# Patient Record
Sex: FEMALE | Race: WHITE | URBAN - METROPOLITAN AREA
[De-identification: names, ages, dates, MRNs, and addresses within clinical notes are randomized per-mention and may not be internally consistent; named-entity substitution may affect disease eponyms.]

---

## 2022-10-24 ENCOUNTER — HOSPITAL ENCOUNTER (OUTPATIENT)
Dept: PHYSICAL THERAPY | Age: 52
Setting detail: RECURRING SERIES
Discharge: HOME | End: 2022-10-27
Payer: COMMERCIAL

## 2022-10-24 PROCEDURE — 97110 THERAPEUTIC EXERCISES: CPT

## 2022-10-24 PROCEDURE — 97161 PT EVAL LOW COMPLEX 20 MIN: CPT

## 2022-10-24 ASSESSMENT — PAIN SCALES - GENERAL: PAINLEVEL_OUTOF10: 3

## 2022-10-24 NOTE — PLAN OF CARE
Rakan Shay  : 1970  Primary: [address]  Secondary:  [address] @ [facility]  [address] [place] [address] [address] Clifford Mcdaniels [facility] [address]  Phone: [phone]  Fax: [phone] Plan Frequency: 2x per week for 12 weeks  Plan of Care/Certification Expiration Date: 23    PT Visit Info:         Visit Count:  1                OUTPATIENT PHYSICAL THERAPY:             OP NOTE TYPE: Initial Assessment 10/24/2022               Episode  Appt Desk         Treatment Diagnosis:  Difficulty in walking, Not elsewhere classified (R26.2), left groin pain R10.32, low back pain M54.5  Medical/Referring Diagnosis:  Left lower quadrant pain [R10.32]  Referring Physician:  NAOMI Zuniga NP, MD Orders:  PT Eval and Treat   Return MD Appt:  as needed  Date of Onset:    August  Allergies:  Patient has no allergy information on record.   Restrictions/Precautions:  none         Medications Last Reviewed:  10/24/2022   Meloxicam, cymbalta, hyrodiuril, adipex-p, desyrel, nulytely  SUBJECTIVE   History of Injury/Illness (Reason for Referral):  Pt with increased left groin pain since August. She reports it is gradually worsening and it is limiting her ability to walk, stand and get out of car  Patient Stated Goal(s):  \"to decrease the pain\"  Initial:     3/10 Post Session:     3/10  Past Medical History/Comorbidities:   HTN, anxiety, joint pain and joint swelling  Social History/Living Environment:   Lives With: Alone  Home Layout: Multi-level     Prior Level of Function/Work/Activity:   Prior level of function: Independent  Occupation: Full time employment  Type of Occupation: special            Learning:   Does the patient/guardian have any barriers to learning?: No barriers  Will there be a co-learner?: No  What is the preferred language of the patient/guardian?: English  Is an  required?: No  How does the patient/guardian prefer to learn new concepts?: Listening; Reading; Demonstration; Pictures/Videos     Fall Risk Scale: Dow Total Score: 25  Dow Fall Risk: Medium (25-44)           OBJECTIVE   LE strength measures:     R LE L LE   Hip flexors   5/5     4-/5   Glut medius  4 /5   3-/5   quad   5/5   4/5   hamstrings   5/5   5/5   Ankle DF's   5/5   5/5   Ankle PF's   5/5   5/5     Passive hip ROM is comparable  Active left hip flexion is limited in sitting postion    ASSESSMENT   Initial Assessment:  Pt with increased left groin pain since August.  She can not recall an injury. She did have x-ray on lumbar spine and hips which showed mild DDD and mild hip OA. She has increased difficulty with prolonged standing, walking and doing her daily tasks like taking out the garbage and getting in and out of car. Problem List: (Impacting functional limitations): Body Structures, Functions, Activity Limitations Requiring Skilled Therapeutic Intervention: Decreased functional mobility ; Decreased strength; Increased pain     Therapy Prognosis:   Therapy Prognosis: Fair     Assessment Complexity:   Low Complexity  PLAN   Effective Dates: 10/24/22 TO Plan of Care/Certification Expiration Date: 01/24/23   Frequency/Duration: Plan Frequency: 2x per week for 12 weeks   Interventions Planned (Treatment may consist of any combination of the following):    Current Treatment Recommendations: Strengthening; ROM; Manual Therapy - Soft Tissue Mobilization; Manual Therapy - Joint Manipulation; Home exercise program; Modalities; Aquatics     Goals: (Goals have been discussed and agreed upon with patient.)  Short-Term Functional Goals: Time Frame: 6 weeks  Pt will be independent with HEP. Pt will be able to walk up to 30 minutes without increased pain. Pt will be able to reciprocally climb 4-6 steps    Discharge Goals: Time Frame: 12 weeks  Pt will be able to walk up to 45  minutes without increased pain.   Pt will be able to increase strength by 1/2 grade to one full grade to raise the leg with more ease. Pt will be able to get in and out of car without difficulty. Pt will be able to perform her normal home ans work tasks. Outcome Measure: Tool Used: Lower Extremity Functional Scale (LEFS)  Score:  Initial: 32/80 Most Recent: X/80 (Date: -- )   Interpretation of Score: 20 questions each scored on a 5 point scale with 0 representing \"extreme difficulty or unable to perform\" and 4 representing \"no difficulty\". The lower the score, the greater the functional disability. 80/80 represents no disability. Minimal detectable change is 9 points. Medical Necessity:   > Skilled intervention continues to be required due to increased pain and limited left hip ROM and strength. Reason For Services/Other Comments:  > Patient continues to require skilled intervention due to lack of full strength and decreased left hip motion affecting her ability to complete her ADL's/ hobbies. Total Duration:  Time In: 1015  Time Out: 1100    Regarding Olivia Carmichael's therapy, I certify that the treatment plan above will be carried out by a therapist or under their direction.   Thank you for this referral,  Charley Das, PT     Referring Physician Signature: Samson Young, * _______________________________ Date _____________        Post Session Pain  Charge Capture  PT Visit Info MD Yuridia Cristobal

## 2022-10-24 NOTE — PROGRESS NOTES
Carl Evans  : 1970  Primary: [address]  Secondary:  [address] @ [facility]  [address] [facility] Mattie Spaulding [place] [address]  Phone: [phone]  Fax: [phone] Plan Frequency: 2x per week for 12 weeks    Plan of Care/Certification Expiration Date: 23      PT Visit Info:  No data recorded   Visit Count:  1   OUTPATIENT PHYSICAL THERAPY:OP NOTE TYPE: Treatment Note 10/24/2022       Episode  }Appt Desk             Treatment Diagnosis:  Difficulty in walking, Not elsewhere classified (R26.2), left groin pain R10.32, lower back pain M54.5  Medical/Referring Diagnosis:  Left lower quadrant pain [R10.32]  Referring Physician:  NAOMI Choi NP, MD Orders:  PT Eval and Treat   Date of Onset:  No data recorded   Allergies:   Patient has no allergy information on record. Restrictions/Precautions:  No data recorded  No data recorded   Interventions Planned (Treatment may consist of any combination of the following):    Current Treatment Recommendations: Strengthening; ROM; Manual Therapy - Soft Tissue Mobilization; Manual Therapy - Joint Manipulation; Home exercise program; Modalities; Aquatics     Subjective Comments:  Pt reports she has some pain today  Initial:}    3/10Post Session:       3/10  Medications Last Reviewed:  10/24/2022  Updated Objective Findings:  See evaluation note from today  Treatment   THERAPEUTIC EXERCISE: (10 minutes):    Exercises per grid below to improve mobility and strength. Required minimal verbal cues to promote proper body mechanics. Progressed resistance, range, and repetitions as indicated. Review of HEP to include hip flexor stretching, sidelying hip flexion, standing hip abduction and palloff press  MANUAL THERAPY: (0 minutes):   Joint mobilization was utilized and necessary because of the patient's restricted joint motion.     Date:  na Date:   Date:     Activity/Exercise Parameters Parameters Parameters   na na Treatment/Session Summary:    Treatment Assessment:  Pt demonstrated proper exercises  Communication/Consultation:  None today  Equipment provided today:  green TB  Recommendations/Intent for next treatment session: Next visit will focus on aquatic therapy.     Total Treatment Billable Duration:  45 minutes  Time In: 7191  Time Out: [address], PT       Charge Capture  }Post Session Pain  PT Visit [address] [facility] Portal  MD Guidelines  Scanned Media  Benefits  MyChart    Future Appointments   Date Time Provider Paulie Mackey   10/27/2022  3:15 PM [address], [facility]   10/31/2022  2:30 PM [address], [facility]   11/3/2022  8:00 AM [address], [facility]

## 2022-10-27 ENCOUNTER — HOSPITAL ENCOUNTER (OUTPATIENT)
Dept: PHYSICAL THERAPY | Age: 52
Setting detail: RECURRING SERIES
End: 2022-10-27
Payer: COMMERCIAL

## 2022-10-27 NOTE — PROGRESS NOTES
Danii Dago  : 1970  Primary: [address]  Secondary:  [address] @ [facility]  [address] [place] [address] Demetrius Poon [place] [address]  Phone: [phone]  Fax: [phone] Plan Frequency: 2x per week for 12 weeks    Plan of Care/Certification Expiration Date: 23      PT Visit Info:  No data recorded       OUTPATIENT PHYSICAL THERAPY 10/27/2022     Appt Desk   Episode   MyChart      Ms. Jailene Najera cancelled appointment today due to schedule conflict.     Ernie Pedroza PTA    Future Appointments   Date Time Provider Paulie Mackey   10/27/2022  3:15 PM Ernie Pedroza [facility]   10/31/2022  2:30 PM Ernie Pedorza [facility]   11/3/2022  8:00 AM Ernie Pedroza PTA [facility]

## 2022-10-31 ENCOUNTER — HOSPITAL ENCOUNTER (OUTPATIENT)
Dept: PHYSICAL THERAPY | Age: 52
Setting detail: RECURRING SERIES
End: 2022-10-31
Payer: COMMERCIAL

## 2022-10-31 NOTE — PROGRESS NOTES
Topher Larson  : 1970  Primary: [address]  Secondary:  Eldon Seo @ [address] [facility] [address]  Phone: [phone]  Fax: [phone] Plan Frequency: 2x per week for 12 weeks    Plan of Care/Certification Expiration Date: 23      PT Visit Info:  No data recorded       OUTPATIENT PHYSICAL THERAPY 10/31/2022     Appt Desk   Episode   MyChart      Ms. Timbo Tomas cancelled appointment today due to schedule conflict.     Lewis Nguyen PTA    Future Appointments   Date Time Provider Paulie Mackey   10/31/2022  2:30 PM Lewis Nguyen [facility]   2022  4:00 PM [facility]   2022  4:00 PM Lewis Nguyen PTA [facility]

## 2022-12-06 ENCOUNTER — HOSPITAL ENCOUNTER (OUTPATIENT)
Dept: PHYSICAL THERAPY | Age: 52
Setting detail: RECURRING SERIES
Discharge: HOME | End: 2022-12-09
Payer: COMMERCIAL

## 2022-12-06 PROCEDURE — 97113 AQUATIC THERAPY/EXERCISES: CPT

## 2022-12-06 ASSESSMENT — PAIN SCALES - GENERAL: PAINLEVEL_OUTOF10: 4

## 2022-12-06 NOTE — PROGRESS NOTES
Monalisa Aleman  : 1970  Primary: [address]  Secondary:  [address] @ [facility]  [address] [facility] [address]  Phone: [phone]  Fax: [phone] Plan Frequency: 2x per week for 12 weeks    Plan of Care/Certification Expiration Date: 23      PT Visit Info:  No data recorded   Visit Count:  2   OUTPATIENT PHYSICAL THERAPY:OP NOTE TYPE: Treatment Note 2022       Episode  }Appt Desk             Treatment Diagnosis:  Difficulty in walking, Not elsewhere classified (R26.2), left groin pain R10.32, lower back pain M54.5  Medical/Referring Diagnosis:  Left lower quadrant pain [R10.32]  Referring Physician:  NAOMI Ye NP, MD Orders:  PT Eval and Treat   Date of Onset:  No data recorded   Allergies:   Patient has no allergy information on record. Restrictions/Precautions:  No data recorded  No data recorded   Interventions Planned (Treatment may consist of any combination of the following):    Current Treatment Recommendations: Strengthening; ROM; Manual Therapy - Soft Tissue Mobilization; Manual Therapy - Joint Manipulation; Home exercise program; Modalities; Aquatics     Subjective Comments:  Patient reports still having the pain in her groin area. Initial:}    4/10Post Session:       2/10  Medications Last Reviewed:  2022  Updated Objective Findings:  None Today  Treatment   THERAPEUTIC EXERCISE: (minutes):    Exercises per grid below to improve mobility and strength. Required minimal verbal cues to promote proper body mechanics. Progressed resistance, range, and repetitions as indicated. Review of HEP to include hip flexor stretching, sidelying hip flexion, standing hip abduction and palloff press  MANUAL THERAPY: (0 minutes):   Joint mobilization was utilized and necessary because of the patient's restricted joint motion.     Date:  na Date:   Date:     Activity/Exercise Parameters Parameters Parameters   na na Aquatic Therapy: 45 min   Walking forward/sidestepping  Hip abd   SLR  Heel toe raises  Alt step ups  marching    Treatment/Session Summary:    Treatment Assessment:  Patient toleratetd treatment well today. Patient plans to possibly see an orthopedic doctor about the possiblity that this discomfort is her hip. Communication/Consultation:  None today  Equipment provided today:  green TB  Recommendations/Intent for next treatment session: Next visit will focus on aquatic therapy.     Total Treatment Billable Duration:  45 minutes  Time In: 0400  Time Out: Barrie Parra, PTA       Charge Capture  }Post Session Pain  PT Visit Info  [facility] Portal  MD Guidelines  Scanned Media  Benefits  MyChart    Future Appointments   Date Time Provider Paulie Mackey   12/8/2022  7:00 PM [facility]

## 2022-12-08 ENCOUNTER — HOSPITAL ENCOUNTER (OUTPATIENT)
Dept: PHYSICAL THERAPY | Age: 52
Setting detail: RECURRING SERIES
End: 2022-12-08
Payer: COMMERCIAL

## 2022-12-08 NOTE — PROGRESS NOTES
Kenia Mason  : 1970  Primary: [address]  Secondary:  [address] @ [facility]  [address] [address]  Phone: [phone]  Fax: [phone] Plan Frequency: 2x per week for 12 weeks    Plan of Care/Certification Expiration Date: 23      PT Visit Info:  No data recorded       OUTPATIENT PHYSICAL THERAPY 2022     Appt Desk   Episode   MyChart      Ms. Fern Carroll cancelled appointment due to schedule conflict.     Anthony Barbosa, PTA    Future Appointments   Date Time Provider Paulie Dunlapisti   2022  7:00 PM [facility]